# Patient Record
Sex: FEMALE
[De-identification: names, ages, dates, MRNs, and addresses within clinical notes are randomized per-mention and may not be internally consistent; named-entity substitution may affect disease eponyms.]

---

## 2018-01-02 ENCOUNTER — APPOINTMENT (OUTPATIENT)
Dept: NEUROLOGY | Facility: CLINIC | Age: 43
End: 2018-01-02
Payer: COMMERCIAL

## 2018-01-02 VITALS
DIASTOLIC BLOOD PRESSURE: 76 MMHG | OXYGEN SATURATION: 99 % | HEART RATE: 68 BPM | SYSTOLIC BLOOD PRESSURE: 98 MMHG | HEIGHT: 66 IN

## 2018-01-02 DIAGNOSIS — S06.0X9A CONCUSSION WITH LOSS OF CONSCIOUSNESS OF UNSPECIFIED DURATION, INITIAL ENCOUNTER: ICD-10-CM

## 2018-01-02 DIAGNOSIS — Z87.891 PERSONAL HISTORY OF NICOTINE DEPENDENCE: ICD-10-CM

## 2018-01-02 PROBLEM — Z00.00 ENCOUNTER FOR PREVENTIVE HEALTH EXAMINATION: Noted: 2018-01-02

## 2018-01-02 PROCEDURE — 99204 OFFICE O/P NEW MOD 45 MIN: CPT

## 2018-01-02 RX ORDER — B-12 KIT 1000MCG/ML
1000 KIT INTRAMUSCULAR; SUBCUTANEOUS; TOPICAL
Refills: 0 | Status: ACTIVE | COMMUNITY

## 2018-01-02 RX ORDER — UBIDECARENONE/VIT E ACET 100MG-5
50 MCG CAPSULE ORAL
Refills: 0 | Status: ACTIVE | COMMUNITY

## 2018-01-02 RX ORDER — CHLORHEXIDINE GLUCONATE, 0.12% ORAL RINSE 1.2 MG/ML
0.12 SOLUTION DENTAL
Qty: 473 | Refills: 0 | Status: ACTIVE | COMMUNITY
Start: 2017-10-15

## 2018-01-02 RX ORDER — AMOXICILLIN 875 MG/1
875 TABLET, FILM COATED ORAL
Qty: 14 | Refills: 0 | Status: ACTIVE | COMMUNITY
Start: 2017-10-15

## 2018-01-02 RX ORDER — CYANOCOBALAMIN 1000 UG/ML
1000 INJECTION INTRAMUSCULAR; SUBCUTANEOUS
Qty: 3 | Refills: 0 | Status: ACTIVE | COMMUNITY
Start: 2017-09-19

## 2020-02-11 ENCOUNTER — NON-APPOINTMENT (OUTPATIENT)
Age: 45
End: 2020-02-11

## 2020-02-11 ENCOUNTER — APPOINTMENT (OUTPATIENT)
Dept: INTERNAL MEDICINE | Facility: CLINIC | Age: 45
End: 2020-02-11
Payer: COMMERCIAL

## 2020-02-11 VITALS
TEMPERATURE: 98.3 F | DIASTOLIC BLOOD PRESSURE: 74 MMHG | OXYGEN SATURATION: 98 % | BODY MASS INDEX: 28.81 KG/M2 | SYSTOLIC BLOOD PRESSURE: 112 MMHG | WEIGHT: 175 LBS | RESPIRATION RATE: 14 BRPM | HEART RATE: 82 BPM | HEIGHT: 65.5 IN

## 2020-02-11 DIAGNOSIS — Z23 ENCOUNTER FOR IMMUNIZATION: ICD-10-CM

## 2020-02-11 DIAGNOSIS — Z78.9 OTHER SPECIFIED HEALTH STATUS: ICD-10-CM

## 2020-02-11 DIAGNOSIS — Z86.39 PERSONAL HISTORY OF OTHER ENDOCRINE, NUTRITIONAL AND METABOLIC DISEASE: ICD-10-CM

## 2020-02-11 DIAGNOSIS — Z86.69 PERSONAL HISTORY OF OTHER DISEASES OF THE NERVOUS SYSTEM AND SENSE ORGANS: ICD-10-CM

## 2020-02-11 PROCEDURE — 90471 IMMUNIZATION ADMIN: CPT

## 2020-02-11 PROCEDURE — 99386 PREV VISIT NEW AGE 40-64: CPT | Mod: 25

## 2020-02-11 PROCEDURE — 90715 TDAP VACCINE 7 YRS/> IM: CPT

## 2020-02-11 NOTE — ASSESSMENT
[FreeTextEntry1] : Healthy female \par diet and exerise\par ADD- follow up with NP and started Vyvanse

## 2020-02-11 NOTE — HEALTH RISK ASSESSMENT
[Very Good] : ~his/her~ current health as very good [] : No [No] : No [0] : 2) Feeling down, depressed, or hopeless: Not at all (0) [ColonoscopyDate] : 10/18 [Patient reported colonoscopy was normal] : Patient reported colonoscopy was normal

## 2020-02-11 NOTE — PHYSICAL EXAM
[No Acute Distress] : no acute distress [Well Developed] : well developed [Well Nourished] : well nourished [Well-Appearing] : well-appearing [Normal Sclera/Conjunctiva] : normal sclera/conjunctiva [EOMI] : extraocular movements intact [PERRL] : pupils equal round and reactive to light [Normal Outer Ear/Nose] : the outer ears and nose were normal in appearance [No JVD] : no jugular venous distention [Normal Oropharynx] : the oropharynx was normal [Supple] : supple [No Lymphadenopathy] : no lymphadenopathy [Thyroid Normal, No Nodules] : the thyroid was normal and there were no nodules present [No Respiratory Distress] : no respiratory distress  [Clear to Auscultation] : lungs were clear to auscultation bilaterally [No Accessory Muscle Use] : no accessory muscle use [Regular Rhythm] : with a regular rhythm [Normal Rate] : normal rate  [Normal S1, S2] : normal S1 and S2 [No Carotid Bruits] : no carotid bruits [No Murmur] : no murmur heard [No Abdominal Bruit] : a ~M bruit was not heard ~T in the abdomen [No Edema] : there was no peripheral edema [Pedal Pulses Present] : the pedal pulses are present [No Varicosities] : no varicosities [No Palpable Aorta] : no palpable aorta [No Extremity Clubbing/Cyanosis] : no extremity clubbing/cyanosis [Normal Appearance] : normal in appearance [No Axillary Lymphadenopathy] : no axillary lymphadenopathy [Soft] : abdomen soft [No Masses] : no abdominal mass palpated [Non-distended] : non-distended [Non Tender] : non-tender [Normal Bowel Sounds] : normal bowel sounds [No HSM] : no HSM [Normal Anterior Cervical Nodes] : no anterior cervical lymphadenopathy [Normal Posterior Cervical Nodes] : no posterior cervical lymphadenopathy [No Spinal Tenderness] : no spinal tenderness [No CVA Tenderness] : no CVA  tenderness [No Joint Swelling] : no joint swelling [Grossly Normal Strength/Tone] : grossly normal strength/tone [No Rash] : no rash [Coordination Grossly Intact] : coordination grossly intact [No Focal Deficits] : no focal deficits [Normal Affect] : the affect was normal [Deep Tendon Reflexes (DTR)] : deep tendon reflexes were 2+ and symmetric [Normal Gait] : normal gait [Normal Insight/Judgement] : insight and judgment were intact

## 2020-03-20 ENCOUNTER — APPOINTMENT (OUTPATIENT)
Dept: OTOLARYNGOLOGY | Facility: CLINIC | Age: 45
End: 2020-03-20
Payer: COMMERCIAL

## 2020-03-20 VITALS
WEIGHT: 175 LBS | SYSTOLIC BLOOD PRESSURE: 114 MMHG | HEIGHT: 65 IN | DIASTOLIC BLOOD PRESSURE: 78 MMHG | BODY MASS INDEX: 29.16 KG/M2 | HEART RATE: 108 BPM

## 2020-03-20 DIAGNOSIS — Z86.69 PERSONAL HISTORY OF OTHER DISEASES OF THE NERVOUS SYSTEM AND SENSE ORGANS: ICD-10-CM

## 2020-03-20 DIAGNOSIS — H72.01 CENTRAL PERFORATION OF TYMPANIC MEMBRANE, RIGHT EAR: ICD-10-CM

## 2020-03-20 DIAGNOSIS — Z87.828 PERSONAL HISTORY OF OTHER (HEALED) PHYSICAL INJURY AND TRAUMA: ICD-10-CM

## 2020-03-20 DIAGNOSIS — H90.2 CONDUCTIVE HEARING LOSS, UNSPECIFIED: ICD-10-CM

## 2020-03-20 PROCEDURE — 99204 OFFICE O/P NEW MOD 45 MIN: CPT

## 2020-03-20 RX ORDER — AMOXICILLIN AND CLAVULANATE POTASSIUM 875; 125 MG/1; MG/1
875-125 TABLET, COATED ORAL TWICE DAILY
Qty: 20 | Refills: 1 | Status: COMPLETED | COMMUNITY
Start: 2020-03-20 | End: 2020-04-09

## 2020-03-20 NOTE — REASON FOR VISIT
[Initial Consultation] : an initial consultation for [Ear Drainage] : ear drainage [FreeTextEntry2] : ear

## 2020-03-20 NOTE — ASSESSMENT
[FreeTextEntry1] : acute otitis media w micro perf ad\par uri\par pred 10 #18\par augmentin 875 #20\par fu 2 weeks

## 2020-03-20 NOTE — CONSULT LETTER
[Consult Letter:] : I had the pleasure of evaluating your patient, [unfilled]. [Consult Closing:] : Thank you very much for allowing me to participate in the care of this patient.  If you have any questions, please do not hesitate to contact me. [Sincerely,] : Sincerely, [FreeTextEntry1] : Dear Dr. BAR BECKHAM,\par \par Thank you for your kind referral. Please refer to my enclosed office notes for LENCHO CAI . If there are any questions free to contact me.\par  [FreeTextEntry3] : Kuldeep Mccartney MD, FACS\par

## 2020-03-20 NOTE — PHYSICAL EXAM
[Midline] : trachea located in midline position [Normal] : no rashes [de-identified] : ad tm bulging red no visible perf

## 2020-03-20 NOTE — REVIEW OF SYSTEMS
[Ear Pain] : ear pain [Ear Itch] : ear itch [Hearing Loss] : hearing loss [Ear Drainage] : ear drainage [Ear Noises] : ear noises [Sinus Pain] : sinus pain [Sinus Pressure] : sinus pressure [Discolored Nasal Discharge] : discolored nasal discharge [Negative] : Heme/Lymph [Patient Intake Form Reviewed] : Patient intake form was reviewed

## 2020-03-20 NOTE — HISTORY OF PRESENT ILLNESS
[de-identified] : rt ear pain 2 d ago both children w uri\par started flonase and claritin worse pain last pm and dc from ear\par decreased hearing\par hx ad perf w uri 20 y ago\par no temp

## 2020-03-25 DIAGNOSIS — H65.21 CHRONIC SEROUS OTITIS MEDIA, RIGHT EAR: ICD-10-CM

## 2020-05-04 ENCOUNTER — APPOINTMENT (OUTPATIENT)
Dept: OTOLARYNGOLOGY | Facility: CLINIC | Age: 45
End: 2020-05-04

## 2020-12-23 PROBLEM — Z86.69 HISTORY OF ACUTE OTITIS MEDIA: Status: RESOLVED | Noted: 2020-03-20 | Resolved: 2020-12-23

## 2021-06-20 ENCOUNTER — TRANSCRIPTION ENCOUNTER (OUTPATIENT)
Age: 46
End: 2021-06-20

## 2022-11-18 ENCOUNTER — NON-APPOINTMENT (OUTPATIENT)
Age: 47
End: 2022-11-18

## 2022-11-18 ENCOUNTER — APPOINTMENT (OUTPATIENT)
Dept: INTERNAL MEDICINE | Facility: CLINIC | Age: 47
End: 2022-11-18

## 2022-11-18 VITALS
WEIGHT: 165 LBS | HEIGHT: 65 IN | DIASTOLIC BLOOD PRESSURE: 82 MMHG | SYSTOLIC BLOOD PRESSURE: 110 MMHG | OXYGEN SATURATION: 98 % | HEART RATE: 71 BPM | RESPIRATION RATE: 14 BRPM | TEMPERATURE: 97 F | BODY MASS INDEX: 27.49 KG/M2

## 2022-11-18 DIAGNOSIS — R92.2 INCONCLUSIVE MAMMOGRAM: ICD-10-CM

## 2022-11-18 DIAGNOSIS — D50.9 IRON DEFICIENCY ANEMIA, UNSPECIFIED: ICD-10-CM

## 2022-11-18 DIAGNOSIS — Z00.00 ENCOUNTER FOR GENERAL ADULT MEDICAL EXAMINATION W/OUT ABNORMAL FINDINGS: ICD-10-CM

## 2022-11-18 DIAGNOSIS — F98.8 OTHER SPECIFIED BEHAVIORAL AND EMOTIONAL DISORDERS WITH ONSET USUALLY OCCURRING IN CHILDHOOD AND ADOLESCENCE: ICD-10-CM

## 2022-11-18 PROCEDURE — 99396 PREV VISIT EST AGE 40-64: CPT | Mod: 25

## 2022-11-18 PROCEDURE — 93000 ELECTROCARDIOGRAM COMPLETE: CPT

## 2022-11-18 RX ORDER — LISDEXAMFETAMINE DIMESYLATE 30 MG/1
30 CAPSULE ORAL
Refills: 0 | Status: DISCONTINUED | COMMUNITY
End: 2022-11-18

## 2022-11-18 RX ORDER — DEXTROAMPHETAMINE SACCHARATE, AMPHETAMINE ASPARTATE, DEXTROAMPHETAMINE SULFATE AND AMPHETAMINE SULFATE 1.25; 1.25; 1.25; 1.25 MG/1; MG/1; MG/1; MG/1
5 TABLET ORAL
Qty: 60 | Refills: 0 | Status: ACTIVE | COMMUNITY
Start: 2022-10-28

## 2022-11-18 RX ORDER — PANTOPRAZOLE 40 MG/1
40 TABLET, DELAYED RELEASE ORAL
Qty: 180 | Refills: 0 | Status: DISCONTINUED | COMMUNITY
Start: 2017-12-26 | End: 2022-11-18

## 2022-11-18 RX ORDER — PREDNISONE 10 MG/1
10 TABLET ORAL
Qty: 18 | Refills: 2 | Status: DISCONTINUED | COMMUNITY
Start: 2020-03-25 | End: 2022-11-18

## 2022-11-18 RX ORDER — VORTIOXETINE 20 MG/1
20 TABLET, FILM COATED ORAL
Qty: 90 | Refills: 0 | Status: ACTIVE | COMMUNITY
Start: 2022-09-08

## 2022-11-18 RX ORDER — LISDEXAMFETAMINE DIMESYLATE 40 MG/1
40 CAPSULE ORAL
Qty: 60 | Refills: 0 | Status: DISCONTINUED | COMMUNITY
Start: 2022-08-23

## 2022-11-18 RX ORDER — PREDNISONE 10 MG/1
10 TABLET ORAL
Qty: 18 | Refills: 2 | Status: DISCONTINUED | COMMUNITY
Start: 2020-03-20 | End: 2022-11-18

## 2022-11-18 RX ORDER — LEVOMEFOLATE/ALGAL OIL 7.5-90.314
7.5-90.314 CAPSULE ORAL
Qty: 30 | Refills: 0 | Status: ACTIVE | COMMUNITY
Start: 2022-10-21

## 2022-11-18 RX ORDER — DEXTROAMPHETAMINE SULFATE, DEXTROAMPHETAMINE SACCHARATE, AMPHETAMINE SULFATE AND AMPHETAMINE ASPARTATE 5; 5; 5; 5 MG/1; MG/1; MG/1; MG/1
20 CAPSULE, EXTENDED RELEASE ORAL
Qty: 30 | Refills: 0 | Status: ACTIVE | COMMUNITY
Start: 2022-10-26

## 2022-11-18 NOTE — ASSESSMENT
[FreeTextEntry1] : HCM- needs repeat colon. Needs mammogram and breast sono\par Follow up with psychiatrist \par check labs and EKG today

## 2022-11-18 NOTE — HISTORY OF PRESENT ILLNESS
[de-identified] : Followed by psychiatrist  for ADD  and LALIT- stable on meds\par Needs mammogram- has not had mammogram for over 3 years.

## 2022-11-18 NOTE — HEALTH RISK ASSESSMENT
[Good] : ~his/her~  mood as  good [No falls in past year] : Patient reported no falls in the past year [0] : 2) Feeling down, depressed, or hopeless: Not at all (0) [Patient reported colonoscopy was normal] : Patient reported colonoscopy was normal [ColonoscopyDate] : 10/18 [ColonoscopyComments] : needs repeat

## 2022-11-22 ENCOUNTER — TRANSCRIPTION ENCOUNTER (OUTPATIENT)
Age: 47
End: 2022-11-22

## 2022-11-22 LAB
25(OH)D3 SERPL-MCNC: 23.8 NG/ML
ALBUMIN SERPL ELPH-MCNC: 4.8 G/DL
ALP BLD-CCNC: 133 U/L
ALT SERPL-CCNC: 41 U/L
ANION GAP SERPL CALC-SCNC: 10 MMOL/L
APPEARANCE: CLEAR
AST SERPL-CCNC: 28 U/L
BACTERIA: NEGATIVE
BASOPHILS # BLD AUTO: 0.06 K/UL
BASOPHILS NFR BLD AUTO: 0.8 %
BILIRUB SERPL-MCNC: 0.3 MG/DL
BILIRUBIN URINE: NEGATIVE
BLOOD URINE: NEGATIVE
BUN SERPL-MCNC: 17 MG/DL
CALCIUM SERPL-MCNC: 9.6 MG/DL
CHLORIDE SERPL-SCNC: 103 MMOL/L
CHOLEST SERPL-MCNC: 185 MG/DL
CO2 SERPL-SCNC: 29 MMOL/L
COLOR: YELLOW
CREAT SERPL-MCNC: 0.66 MG/DL
EGFR: 109 ML/MIN/1.73M2
EOSINOPHIL # BLD AUTO: 0.14 K/UL
EOSINOPHIL NFR BLD AUTO: 1.9 %
ESTIMATED AVERAGE GLUCOSE: 120 MG/DL
FOLATE SERPL-MCNC: >20 NG/ML
GLUCOSE QUALITATIVE U: NEGATIVE
GLUCOSE SERPL-MCNC: 105 MG/DL
HBA1C MFR BLD HPLC: 5.8 %
HCT VFR BLD CALC: 42.9 %
HDLC SERPL-MCNC: 50 MG/DL
HGB BLD-MCNC: 13.9 G/DL
HYALINE CASTS: 0 /LPF
IMM GRANULOCYTES NFR BLD AUTO: 2 %
KETONES URINE: NEGATIVE
LDLC SERPL CALC-MCNC: 108 MG/DL
LEUKOCYTE ESTERASE URINE: NEGATIVE
LYMPHOCYTES # BLD AUTO: 2.26 K/UL
LYMPHOCYTES NFR BLD AUTO: 30.8 %
MAN DIFF?: NORMAL
MCHC RBC-ENTMCNC: 29.1 PG
MCHC RBC-ENTMCNC: 32.4 GM/DL
MCV RBC AUTO: 89.7 FL
MICROSCOPIC-UA: NORMAL
MONOCYTES # BLD AUTO: 0.59 K/UL
MONOCYTES NFR BLD AUTO: 8 %
NEUTROPHILS # BLD AUTO: 4.14 K/UL
NEUTROPHILS NFR BLD AUTO: 56.5 %
NITRITE URINE: NEGATIVE
NONHDLC SERPL-MCNC: 134 MG/DL
PH URINE: 7
PLATELET # BLD AUTO: 289 K/UL
POTASSIUM SERPL-SCNC: 4 MMOL/L
PROT SERPL-MCNC: 6.8 G/DL
PROTEIN URINE: NORMAL
RBC # BLD: 4.78 M/UL
RBC # FLD: 14.6 %
RED BLOOD CELLS URINE: 3 /HPF
SODIUM SERPL-SCNC: 142 MMOL/L
SPECIFIC GRAVITY URINE: 1.02
SQUAMOUS EPITHELIAL CELLS: 1 /HPF
TRIGL SERPL-MCNC: 131 MG/DL
TSH SERPL-ACNC: 1.16 UIU/ML
UROBILINOGEN URINE: NORMAL
VIT B12 SERPL-MCNC: 296 PG/ML
WBC # FLD AUTO: 7.34 K/UL
WHITE BLOOD CELLS URINE: 3 /HPF

## 2023-01-17 ENCOUNTER — NON-APPOINTMENT (OUTPATIENT)
Age: 48
End: 2023-01-17

## 2023-12-22 ENCOUNTER — NON-APPOINTMENT (OUTPATIENT)
Age: 48
End: 2023-12-22

## 2025-02-14 ENCOUNTER — OFFICE (OUTPATIENT)
Dept: URBAN - METROPOLITAN AREA CLINIC 12 | Facility: CLINIC | Age: 50
Setting detail: OPHTHALMOLOGY
End: 2025-02-14
Payer: COMMERCIAL

## 2025-02-14 DIAGNOSIS — H25.13: ICD-10-CM

## 2025-02-14 DIAGNOSIS — H35.413: ICD-10-CM

## 2025-02-14 DIAGNOSIS — H33.8: ICD-10-CM

## 2025-02-14 DIAGNOSIS — H43.813: ICD-10-CM

## 2025-02-14 PROBLEM — H53.002 AMBLYOPIA; LEFT EYE: Status: ACTIVE | Noted: 2025-02-14

## 2025-02-14 PROCEDURE — 92250 FUNDUS PHOTOGRAPHY W/I&R: CPT | Performed by: OPHTHALMOLOGY

## 2025-02-14 PROCEDURE — 99204 OFFICE O/P NEW MOD 45 MIN: CPT | Performed by: OPHTHALMOLOGY

## 2025-02-14 ASSESSMENT — KERATOMETRY
OD_K2POWER_DIOPTERS: 5.25
OS_K1POWER_DIOPTERS: 45.00
OS_K2POWER_DIOPTERS: 46.00
OD_AXISANGLE_DEGREES: 113
OS_AXISANGLE_DEGREES: 069
OD_K1POWER_DIOPTERS: 44.00

## 2025-02-14 ASSESSMENT — REFRACTION_MANIFEST
OD_CYLINDER: -3.25
OS_SPHERE: UNABLE
OD_AXIS: 020
OD_VA1: 20/30-1
OD_SPHERE: -16.00

## 2025-02-14 ASSESSMENT — REFRACTION_AUTOREFRACTION
OS_SPHERE: UNABLE
OD_AXIS: 020
OD_SPHERE: -16.00
OD_CYLINDER: -3.25

## 2025-02-14 ASSESSMENT — REFRACTION_CURRENTRX
OD_CYLINDER: -2.25
OD_SPHERE: -12.75
OD_OVR_VA: 20/
OS_CYLINDER: -1.75
OS_OVR_VA: 20/
OS_SPHERE: -14.25
OD_AXIS: 028
OS_AXIS: 167

## 2025-02-14 ASSESSMENT — CONFRONTATIONAL VISUAL FIELD TEST (CVF)
OD_FINDINGS: FULL
OS_FINDINGS: FULL

## 2025-02-14 ASSESSMENT — VISUAL ACUITY
OD_BCVA: 20/500
OS_BCVA: 20/70-2

## 2025-02-14 ASSESSMENT — TONOMETRY
OD_IOP_MMHG: 15
OS_IOP_MMHG: 20